# Patient Record
Sex: FEMALE | Race: BLACK OR AFRICAN AMERICAN | NOT HISPANIC OR LATINO | Employment: FULL TIME | URBAN - METROPOLITAN AREA
[De-identification: names, ages, dates, MRNs, and addresses within clinical notes are randomized per-mention and may not be internally consistent; named-entity substitution may affect disease eponyms.]

---

## 2017-06-29 ENCOUNTER — HOSPITAL ENCOUNTER (OUTPATIENT)
Dept: MAMMOGRAPHY | Facility: CLINIC | Age: 52
End: 2017-06-29
Attending: OBSTETRICS & GYNECOLOGY
Payer: COMMERCIAL

## 2017-06-29 ENCOUNTER — HOSPITAL ENCOUNTER (OUTPATIENT)
Dept: MAMMOGRAPHY | Facility: CLINIC | Age: 52
Discharge: HOME | End: 2017-06-29
Attending: OBSTETRICS & GYNECOLOGY | Admitting: OBSTETRICS & GYNECOLOGY
Payer: COMMERCIAL

## 2017-06-29 DIAGNOSIS — N64.4 BREAST PAIN: ICD-10-CM

## 2017-06-29 DIAGNOSIS — Z12.31 VISIT FOR SCREENING MAMMOGRAM: ICD-10-CM

## 2017-06-29 PROCEDURE — G0279 TOMOSYNTHESIS, MAMMO: HCPCS

## 2017-06-29 PROCEDURE — 76642 ULTRASOUND BREAST LIMITED: CPT | Mod: 50

## 2018-07-10 ENCOUNTER — HOSPITAL ENCOUNTER (OUTPATIENT)
Dept: MAMMOGRAPHY | Facility: CLINIC | Age: 53
Discharge: HOME | End: 2018-07-10
Attending: OBSTETRICS & GYNECOLOGY | Admitting: OBSTETRICS & GYNECOLOGY
Payer: COMMERCIAL

## 2018-07-10 DIAGNOSIS — Z12.31 VISIT FOR SCREENING MAMMOGRAM: ICD-10-CM

## 2018-07-10 PROCEDURE — 77067 SCR MAMMO BI INCL CAD: CPT

## 2018-12-17 ENCOUNTER — OFFICE VISIT (OUTPATIENT)
Dept: URGENT CARE | Facility: URGENT CARE | Age: 53
End: 2018-12-17
Payer: COMMERCIAL

## 2018-12-17 VITALS
TEMPERATURE: 97.8 F | OXYGEN SATURATION: 98 % | HEART RATE: 74 BPM | RESPIRATION RATE: 16 BRPM | SYSTOLIC BLOOD PRESSURE: 125 MMHG | DIASTOLIC BLOOD PRESSURE: 81 MMHG

## 2018-12-17 DIAGNOSIS — L02.219 CELLULITIS AND ABSCESS OF TRUNK: Primary | ICD-10-CM

## 2018-12-17 DIAGNOSIS — L03.319 CELLULITIS AND ABSCESS OF TRUNK: Primary | ICD-10-CM

## 2018-12-17 PROCEDURE — 99203 OFFICE O/P NEW LOW 30 MIN: CPT | Mod: 25

## 2018-12-17 PROCEDURE — 10060 I&D ABSCESS SIMPLE/SINGLE: CPT

## 2018-12-17 RX ORDER — CEPHALEXIN 500 MG/1
500 CAPSULE ORAL 3 TIMES DAILY
Qty: 15 CAPSULE | Refills: 0 | Status: SHIPPED | OUTPATIENT
Start: 2018-12-17 | End: 2018-12-22

## 2018-12-17 ASSESSMENT — ENCOUNTER SYMPTOMS
CHILLS: 0
FATIGUE: 0
FEVER: 0

## 2018-12-18 NOTE — PATIENT INSTRUCTIONS
Patient Education     Abscess (Incision & Drainage)  An abscess is sometimes called a boil. It happens when bacteria get trapped under the skin and start to grow. Pus forms inside the abscess as the body responds to the bacteria. An abscess can happen with an insect bite, ingrown hair, blocked oil gland, pimple, cyst, or puncture wound.  Your healthcare provider has drained the pus from your abscess. If the abscess pocket was large, your healthcare provider may have put in gauze packing. Your provider will need to remove it on your next visit. He or she may also replace it at that time. You may not need antibiotics to treat a simple abscess, unless the infection is spreading into the skin around the wound (cellulitis).  The wound will take about 1 to 2 weeks to heal, depending on the size of the abscess. Healthy tissue will grow from the bottom and sides of the opening until it seals over.  Home care  These tips can help your wound heal:    The wound may drain for the first 2 days. Cover the wound with a clean dry dressing. Change the dressing if it becomes soaked with blood or pus.    If a gauze packing was placed inside the abscess pocket, you may be told to remove it yourself. You may do this in the shower. Once the packing is removed, you should wash the area in the shower, or clean the area as directed by your provider. Continue to do this until the skin opening has closed. Make sure you wash your hands after changing the packing or cleaning the wound.    If you were prescribed antibiotics, take them as directed until they are all gone.    You may use acetaminophen or ibuprofen to control pain, unless another pain medicine was prescribed. If you have liver disease or ever had a stomach ulcer, talk with your doctor before using these medicines.  Follow-up care  Follow up with your healthcare provider, or as advised. If a gauze packing was put in your wound, it should be removed in 1 to 2 days. Check your wound  every day for any signs that the infection is getting worse. The signs are listed below.  When to seek medical advice  Call your healthcare provider right away if any of these occur:    Increasing redness or swelling    Red streaks in the skin leading away from the wound    Increasing local pain or swelling    Continued pus draining from the wound 2 days after treatment    Fever of 100.4 F (38 C) or higher, or as directed by your healthcare provider    Boil returns when you are at home  Date Last Reviewed: 9/1/2016 2000-2018 [employer]. [address] [place], [place] [id number]. All rights reserved. This information is not intended as a substitute for professional medical care. Always follow your healthcare professional's instructions.

## 2018-12-18 NOTE — PROGRESS NOTES
SUBJECTIVE:   Lyssa Casey is a 53 year old female presenting with a chief complaint of   Chief Complaint   Patient presents with     Urgent Care     Derm Problem     Pt has had cyst on left breast for year but this weekend it started to drain and also become painful        She is a new patient of [facility].    Rash  Cyst under breast for years. Past two days painful, draining and erythematous  Onset of rash was 2 day(s) ago.   Course of illness is sudden onset and worsening.  Severity moderate  Current and Associated symptoms: painful, discharge and red   Location of the rash: under breast.  Previous history of a similar rash? No  Recent exposure history: none known  Denies exposure to: none known  Associated symptoms include: nothing.  Treatment measures tried include: none      Review of Systems   Constitutional: Negative for chills, fatigue and fever.       History reviewed. No pertinent past medical history.  History reviewed. No pertinent family history.  Current Outpatient Medications   Medication Sig Dispense Refill     cephALEXin (KEFLEX) 500 MG capsule Take 1 capsule (500 mg) by mouth 3 times daily for 5 days 15 capsule 0     Social History     Tobacco Use     Smoking status: Never Smoker     Smokeless tobacco: Never Used   Substance Use Topics     Alcohol use: Not on file       OBJECTIVE  /81   Pulse 74   Temp 97.8  F (36.6  C) (Tympanic)   Resp 16   SpO2 98%     Physical Exam   Skin:          Discussed fact she has abscess and need drainage. Lesion anasesthia with lidocaine 1%. 11 blade opened wound. Packed with nu gauze  Labs:  No results found for this or any previous visit (from the past 24 hour(s)).    X-Ray was not done.      With patient permission. Use of 1% lidocaine. Used 1 ml for local anaesthetic. 11 blade opened abscess cavity 1 cm. Drained sebum and some pus. Pulled out pieces of cyst wall. Minimal bleeding. Packed with plain 1/2 inch gauze. Patient tolerated very  well  ASSESSMENT:      ICD-10-CM    1. Cellulitis and abscess of trunk L03.319     L02.219         Medical Decision Making:    Differential Diagnosis:  None    Serious Comorbid Conditions:  Adult:  None    PLAN:  Keflex tid for five days. Packing daily for next two days. Keep skin clean and covered until seals.   Followup:    If not improving or if condition worsens, follow up with your Primary Care Provider    Patient Instructions   Patient Education     Abscess (Incision & Drainage)  An abscess is sometimes called a boil. It happens when bacteria get trapped under the skin and start to grow. Pus forms inside the abscess as the body responds to the bacteria. An abscess can happen with an insect bite, ingrown hair, blocked oil gland, pimple, cyst, or puncture wound.  Your healthcare provider has drained the pus from your abscess. If the abscess pocket was large, your healthcare provider may have put in gauze packing. Your provider will need to remove it on your next visit. He or she may also replace it at that time. You may not need antibiotics to treat a simple abscess, unless the infection is spreading into the skin around the wound (cellulitis).  The wound will take about 1 to 2 weeks to heal, depending on the size of the abscess. Healthy tissue will grow from the bottom and sides of the opening until it seals over.  Home care  These tips can help your wound heal:    The wound may drain for the first 2 days. Cover the wound with a clean dry dressing. Change the dressing if it becomes soaked with blood or pus.    If a gauze packing was placed inside the abscess pocket, you may be told to remove it yourself. You may do this in the shower. Once the packing is removed, you should wash the area in the shower, or clean the area as directed by your provider. Continue to do this until the skin opening has closed. Make sure you wash your hands after changing the packing or cleaning the wound.    If you were prescribed  antibiotics, take them as directed until they are all gone.    You may use acetaminophen or ibuprofen to control pain, unless another pain medicine was prescribed. If you have liver disease or ever had a stomach ulcer, talk with your doctor before using these medicines.  Follow-up care  Follow up with your healthcare provider, or as advised. If a gauze packing was put in your wound, it should be removed in 1 to 2 days. Check your wound every day for any signs that the infection is getting worse. The signs are listed below.  When to seek medical advice  Call your healthcare provider right away if any of these occur:    Increasing redness or swelling    Red streaks in the skin leading away from the wound    Increasing local pain or swelling    Continued pus draining from the wound 2 days after treatment    Fever of 100.4 F (38 C) or higher, or as directed by your healthcare provider    Boil returns when you are at home  Date Last Reviewed: 9/1/2016 2000-2018 [employer]. [address] [place] [address], [place] [address]. All rights reserved. This information is not intended as a substitute for professional medical care. Always follow your healthcare professional's instructions.

## 2019-05-28 ENCOUNTER — OFFICE VISIT (OUTPATIENT)
Dept: URGENT CARE | Facility: URGENT CARE | Age: 54
End: 2019-05-28
Payer: COMMERCIAL

## 2019-05-28 VITALS
WEIGHT: 147 LBS | SYSTOLIC BLOOD PRESSURE: 120 MMHG | HEART RATE: 85 BPM | OXYGEN SATURATION: 99 % | RESPIRATION RATE: 16 BRPM | DIASTOLIC BLOOD PRESSURE: 65 MMHG

## 2019-05-28 DIAGNOSIS — M62.838 MUSCLE SPASMS OF NECK: Primary | ICD-10-CM

## 2019-05-28 PROCEDURE — 99214 OFFICE O/P EST MOD 30 MIN: CPT | Performed by: PHYSICIAN ASSISTANT

## 2019-05-28 RX ORDER — CYCLOBENZAPRINE HCL 5 MG
5 TABLET ORAL 3 TIMES DAILY PRN
Qty: 45 TABLET | Refills: 0 | Status: SHIPPED | OUTPATIENT
Start: 2019-05-28

## 2019-05-28 NOTE — PATIENT INSTRUCTIONS
(Q54.918) Muscle spasms of neck  (primary encounter diagnosis)  Comment:   Plan: cyclobenzaprine (FLEXERIL) 5 MG tablet,         acetaminophen-codeine (TYLENOL #3) 300-30 MG         tablet            Take the ibuprofen 800mg with food every 8 hours  Take flexeril 5 mg every 8 hours while at home resting or just at bedtime.     Tylenol #3 as needed for breakthrough pain.      Heat, stretches, ice as discussed 3-4 times a day  (heat 10, stretch then 20 min ice) all over thin cloth.      Follow up with primary clinic should symptoms persist or worsen.

## 2019-05-28 NOTE — PROGRESS NOTES
Patient presents with:  Neck Pain: Pt states she has had L neck pain for 2 weeks that has gotten increasingly worse    SUBJECTIVE:  Chief Complaint   Patient presents with     Neck Pain     Pt states she has had L neck pain for 2 weeks that has gotten increasingly worse     Lyssa Casey is a 53 year old female presents with a chief complaint of left sided neck pain, onset 2 weeks ago, progressively worsening.  No specific trauma.  Had been doing a lifting program, but stopped it about 1.5 weeks ago.    Does play a lot of tennis.      Denies any numbness or tingling in her upper extremities.      She has been alternating advil and tylenol which takes the edge off, but symptoms persist.    Pain exacerbated by movement.  Relieved by as above.  She treated it initially with as above.     No history of similar symptoms.     No past medical history on file.   Denies any significant PMH    FH  Denies any significant FH      There is no problem list on file for this patient.    Social History     Tobacco Use     Smoking status: Never Smoker     Smokeless tobacco: Never Used   Substance Use Topics     Alcohol use: Not on file       ROS:  CONSTITUTIONAL:NEGATIVE for fever, chills, change in weight  INTEGUMENTARY/SKIN: NEGATIVE for worrisome rashes, moles or lesions  ENT/MOUTH: NEGATIVE for ear, mouth and throat problems  RESP:NEGATIVE for significant cough or SOB  CV: NEGATIVE for chest pain, palpitations or peripheral edema  GI: NEGATIVE for nausea, abdominal pain, heartburn, or change in bowel habits  MUSCULOSKELETAL: as per HPI  NEURO: negative  ENDOCRINE: NEGATIVE for temperature intolerance, skin/hair changes  Review of systems negative except as stated above.    EXAM:   /65 (BP Location: Right arm, Patient Position: Sitting, Cuff Size: Adult Regular)   Pulse 85   Resp 16   Wt 66.7 kg (147 lb)   SpO2 99%   Gen: healthy,alert,mild distress secondary to pain  NECK: palpable muscle spasm on left neck.  No  vertebral tenderness.    ROM is limited secondary to pain.    Upper Extremity: ROM is mildly decreased secondary to pain.   There is not compromise to the distal circulation.  Pulses are +2 and CRT is brisk  GENERAL APPEARANCE: healthy, alert and no distress  SKIN: no suspicious lesions or rashes  NEURO: Normal strength and tone, sensory exam grossly normal, mentation intact and speech normal    (M62.838) Muscle spasms of neck  (primary encounter diagnosis)  Comment:   Plan: cyclobenzaprine (FLEXERIL) 5 MG tablet,         acetaminophen-codeine (TYLENOL #3) 300-30 MG         tablet          Take the ibuprofen 800mg with food every 8 hours  Take flexeril 5 mg every 8 hours while at home resting or just at bedtime.     Tylenol #3 as needed for breakthrough pain.  Use with caution.      Heat, stretches, ice as discussed 3-4 times a day  (heat 10, stretch then 20 min ice) all over thin cloth.      Follow up with primary clinic should symptoms persist or worsen.    Patient expresses understanding and agreement with the assessment and plan as above.

## 2019-07-16 ENCOUNTER — HOSPITAL ENCOUNTER (OUTPATIENT)
Dept: MAMMOGRAPHY | Facility: CLINIC | Age: 54
Discharge: HOME | End: 2019-07-16
Attending: OBSTETRICS & GYNECOLOGY | Admitting: OBSTETRICS & GYNECOLOGY
Payer: COMMERCIAL

## 2019-07-16 DIAGNOSIS — Z12.31 VISIT FOR SCREENING MAMMOGRAM: ICD-10-CM

## 2019-07-16 PROCEDURE — 77063 BREAST TOMOSYNTHESIS BI: CPT

## 2020-07-21 ENCOUNTER — HOSPITAL ENCOUNTER (OUTPATIENT)
Dept: MAMMOGRAPHY | Facility: CLINIC | Age: 55
Discharge: HOME | End: 2020-07-21
Attending: OBSTETRICS & GYNECOLOGY | Admitting: OBSTETRICS & GYNECOLOGY
Payer: COMMERCIAL

## 2020-07-21 DIAGNOSIS — Z12.31 VISIT FOR SCREENING MAMMOGRAM: ICD-10-CM

## 2020-07-21 PROCEDURE — 77063 BREAST TOMOSYNTHESIS BI: CPT

## 2021-07-23 ENCOUNTER — HOSPITAL ENCOUNTER (OUTPATIENT)
Dept: MAMMOGRAPHY | Facility: CLINIC | Age: 56
Discharge: HOME | End: 2021-07-23
Attending: PHYSICIAN ASSISTANT | Admitting: PHYSICIAN ASSISTANT
Payer: COMMERCIAL

## 2021-07-23 DIAGNOSIS — Z12.31 VISIT FOR SCREENING MAMMOGRAM: ICD-10-CM

## 2021-07-23 PROCEDURE — 77063 BREAST TOMOSYNTHESIS BI: CPT

## 2022-08-05 ENCOUNTER — HOSPITAL ENCOUNTER (OUTPATIENT)
Dept: MAMMOGRAPHY | Facility: CLINIC | Age: 57
Discharge: HOME | End: 2022-08-05
Attending: PHYSICIAN ASSISTANT | Admitting: PHYSICIAN ASSISTANT
Payer: COMMERCIAL

## 2022-08-05 DIAGNOSIS — Z12.31 VISIT FOR SCREENING MAMMOGRAM: ICD-10-CM

## 2022-08-05 PROCEDURE — 77067 SCR MAMMO BI INCL CAD: CPT

## 2023-08-07 ENCOUNTER — HOSPITAL ENCOUNTER (OUTPATIENT)
Dept: MAMMOGRAPHY | Facility: CLINIC | Age: 58
Discharge: HOME | End: 2023-08-07
Attending: PHYSICIAN ASSISTANT | Admitting: PHYSICIAN ASSISTANT
Payer: COMMERCIAL

## 2023-08-07 DIAGNOSIS — Z12.31 VISIT FOR SCREENING MAMMOGRAM: ICD-10-CM

## 2023-08-07 PROCEDURE — 77067 SCR MAMMO BI INCL CAD: CPT

## 2024-03-06 PROCEDURE — 25605 CLTX DST RDL FX/EPHYS SEP W/: CPT | Mod: LT

## 2024-03-06 PROCEDURE — 99284 EMERGENCY DEPT VISIT MOD MDM: CPT | Mod: 25

## 2024-03-07 ENCOUNTER — HOSPITAL ENCOUNTER (EMERGENCY)
Facility: CLINIC | Age: 59
Discharge: HOME | End: 2024-03-07
Attending: EMERGENCY MEDICINE | Admitting: EMERGENCY MEDICINE
Payer: COMMERCIAL

## 2024-03-07 ENCOUNTER — APPOINTMENT (OUTPATIENT)
Dept: GENERAL RADIOLOGY | Facility: CLINIC | Age: 59
End: 2024-03-07
Attending: EMERGENCY MEDICINE
Payer: COMMERCIAL

## 2024-03-07 VITALS
HEIGHT: 60 IN | WEIGHT: 152 LBS | TEMPERATURE: 98.3 F | HEART RATE: 79 BPM | RESPIRATION RATE: 16 BRPM | DIASTOLIC BLOOD PRESSURE: 70 MMHG | OXYGEN SATURATION: 99 % | SYSTOLIC BLOOD PRESSURE: 114 MMHG | BODY MASS INDEX: 29.84 KG/M2

## 2024-03-07 DIAGNOSIS — S52.532A CLOSED COLLES' FRACTURE OF LEFT RADIUS, INITIAL ENCOUNTER: ICD-10-CM

## 2024-03-07 PROCEDURE — 999N000065 XR WRIST LEFT G/E 3 VIEWS: Mod: LT

## 2024-03-07 PROCEDURE — 250N000013 HC RX MED GY IP 250 OP 250 PS 637: Performed by: EMERGENCY MEDICINE

## 2024-03-07 PROCEDURE — 73110 X-RAY EXAM OF WRIST: CPT | Mod: LT

## 2024-03-07 RX ORDER — ACETAMINOPHEN 500 MG
1000 TABLET ORAL ONCE
Status: COMPLETED | OUTPATIENT
Start: 2024-03-07 | End: 2024-03-07

## 2024-03-07 RX ORDER — OXYCODONE HYDROCHLORIDE 5 MG/1
5 TABLET ORAL ONCE
Status: COMPLETED | OUTPATIENT
Start: 2024-03-07 | End: 2024-03-07

## 2024-03-07 RX ORDER — OXYCODONE HYDROCHLORIDE 5 MG/1
5 TABLET ORAL EVERY 6 HOURS PRN
Qty: 8 TABLET | Refills: 0 | Status: SHIPPED | OUTPATIENT
Start: 2024-03-07 | End: 2024-03-10

## 2024-03-07 RX ADMIN — ACETAMINOPHEN 1000 MG: 500 TABLET, FILM COATED ORAL at 04:31

## 2024-03-07 RX ADMIN — OXYCODONE HYDROCHLORIDE 5 MG: 5 TABLET ORAL at 04:31

## 2024-03-07 ASSESSMENT — ACTIVITIES OF DAILY LIVING (ADL)
ADLS_ACUITY_SCORE: 35

## 2024-03-07 ASSESSMENT — COLUMBIA-SUICIDE SEVERITY RATING SCALE - C-SSRS
1. IN THE PAST MONTH, HAVE YOU WISHED YOU WERE DEAD OR WISHED YOU COULD GO TO SLEEP AND NOT WAKE UP?: NO
6. HAVE YOU EVER DONE ANYTHING, STARTED TO DO ANYTHING, OR PREPARED TO DO ANYTHING TO END YOUR LIFE?: NO
2. HAVE YOU ACTUALLY HAD ANY THOUGHTS OF KILLING YOURSELF IN THE PAST MONTH?: NO

## 2024-03-07 NOTE — ED TRIAGE NOTES
Patient states fell; pain to left wrist.  Fell.  Did not hit head.  Fell playing tennis.  Happened at 10pm.     Triage Assessment (Adult)       Row Name 03/07/24 0008          Triage Assessment    Airway WDL WDL        Respiratory WDL    Respiratory WDL WDL        Skin Circulation/Temperature WDL    Skin Circulation/Temperature WDL WDL        Cardiac WDL    Cardiac WDL WDL        Cognitive/Neuro/Behavioral WDL    Cognitive/Neuro/Behavioral WDL WDL

## 2024-03-07 NOTE — ED PROVIDER NOTES
History     Chief Complaint:  Wrist Injury       HPI   Lyssa Casey is a 58 year old female presenting to the emergency department for evaluation of a left wrist injury. The patient reports falling tonight around 2200 while playing tennis and broke her fall with her left hand. She reports pain and tenderness of her left wrist and forearm. She denies hitting her head.         Independent Historian:   None - Patient Only          Medications:    Flexeril  Wegovy     Past Medical History:    Patient denies past medical history.    Past Surgical History:    None.      Physical Exam   Patient Vitals for the past 24 hrs:   BP Temp Temp src Pulse Resp SpO2 Height Weight   03/07/24 0519 114/70 -- -- 79 -- -- -- --   03/07/24 0010 (!) 141/89 98.3  F (36.8  C) Oral 88 16 99 % 1.524 m (5') 68.9 kg (152 lb)        Physical Exam  General:  Alert, nontoxic in appearance  CV:  Appears well perfused  Lungs:  No obvious respiratory distress  Neuro:  Speaking clearly, no slurred speech  MSK: +tenderness and mild swelling to left wrist.  No tenderness to remainder of arm.  Distal pulses intact.  Normal strength, cap refill, and sensation distally.       Emergency Department Course     Imaging:  XR Wrist Left G/E 3 Views   Final Result   IMPRESSION: Casting material obscures some details. Partial reduction of the transverse fracture of the distal radius. Mild dorsal angulation and displacement remains. Clinical correlation for alignment recommended.                     XR Wrist Left G/E 3 Views   Final Result   IMPRESSION:       Acute angulated transverse fracture of the distal radial metaphysis. There is dorsal angulation of the distal fracture fragment. Questionable lucency at the articular surface, raising suspicion for intra-articular extension. Adjacent soft tissue    swelling.      No evidence of additional acute fracture. Small well-corticated ossification at the ulnar styloid, likely sequela of remote injury.      No  dislocation.             Laboratory:  Labs Ordered and Resulted from Time of ED Arrival to Time of ED Departure - No data to display     Procedures    Hematoma Block     LOCATION:  Left distal radius    CONSENT: Risks and benefits were discussed with the patient and verbal consent for the procedure was obtained     ANESTHESIA: Hematoma block using 0.5 Bupivacaine without Epinephrine , total of 5 mLs    PROCEDURE NOTE: The site of injection was cleaned and prepped with chloraprep.  Flash back of blood was seen to confirm appropriate needle position.  Above anesthetic was injected into the fracture site. The patient tolerated the procedure well with good relief of discomfort and there were no complications.   0423        I performed the hematoma block.      Fracture Reduction     Procedure: Fracture Reduction     Indication: Left distal radius fracture    Location: Left     Consent: Written from Patient   Risks (including but not limited to: neurologic compromise, vascular injury, pain, and inability to reduce fracture), benefits, and alternatives were discussed with patient and consent for procedure was obtained.     Universal Protocol: Universal protocol was followed and time out conducted just prior to starting procedure, confirming patient identity, site/side, procedure, patient position, and availability of correct equipment and implants.     Anesthesia/Sedation: Bupivacaine - 0.5%    Procedure Detail: I manually manipulated and reduced the fracture. Ultrasound was used.    Immobilized with: Plaster sugar tong splint  Post-reduction x-ray showed adequate reduction/not adequate reduction  Post-procedure distal neurovascular function was intact    Patient Status:  The patient tolerated the procedure well: Yes. There were no complications.  The procedure was performed at 0550.    Emergency Department Course & Assessments:    Interventions:  Medications   oxyCODONE (ROXICODONE) tablet 5 mg (5 mg Oral $Given 3/7/24  0431)   acetaminophen (TYLENOL) tablet 1,000 mg (1,000 mg Oral $Given 3/7/24 0431)        Assessments:  0405        I obtained history and examined the patient as noted above.   0423        I performed the hematoma block  0550        I performed the fracture reduction    Independent Interpretation (X-rays, CTs, rhythm strip):  On my independent interpretation of wrist XR showing radius fracture    Consultations/Discussion of Management or Tests:  None        Social Determinants of Health affecting care:   None    Disposition:  The patient was discharged.     Impression & Plan      Medical Decision Making:  Lyssa Casey presents with left wrist pain after she fell.  X-rays were obtained that did show a distal radius fracture.  Patient denies any other injury.  After discussing risks and benefits, I did perform a hematoma block and reduction and splinting was performed.  Patient did have improved alignment of the fracture fragment.  Patient was discharged home with recommendation for supportive care and follow-up with orthopedics.      Diagnosis:    ICD-10-CM    1. Closed Colles' fracture of left radius, initial encounter  S52.532A            Discharge Medications:  Discharge Medication List as of 3/7/2024  6:22 AM        START taking these medications    Details   oxyCODONE (ROXICODONE) 5 MG tablet Take 1 tablet (5 mg) by mouth every 6 hours as needed, Disp-8 tablet, R-0, Local Print                Scribe Disclosure:  I, Felipe Rodriguez, am serving as a scribe at 4:15 AM on 3/7/2024 to document services personally performed by Tahir Moyer MD based on my observations and the provider's statements to me.   3/7/2024   Tahir Moyer MD Bergenstal, John A, MD  03/07/24 4855

## 2024-08-13 ENCOUNTER — HOSPITAL ENCOUNTER (OUTPATIENT)
Dept: MAMMOGRAPHY | Facility: CLINIC | Age: 59
Discharge: HOME | End: 2024-08-13
Attending: OBSTETRICS & GYNECOLOGY | Admitting: OBSTETRICS & GYNECOLOGY
Payer: COMMERCIAL

## 2024-08-13 DIAGNOSIS — Z12.31 VISIT FOR SCREENING MAMMOGRAM: ICD-10-CM

## 2024-08-13 PROCEDURE — 77063 BREAST TOMOSYNTHESIS BI: CPT

## 2024-09-21 ENCOUNTER — TELEPHONE (OUTPATIENT)
Dept: OBGYN | Facility: CLINIC | Age: 59
End: 2024-09-21
Payer: COMMERCIAL

## 2024-09-21 DIAGNOSIS — R11.2 NAUSEA AND VOMITING, UNSPECIFIED VOMITING TYPE: Primary | ICD-10-CM

## 2024-09-21 NOTE — TELEPHONE ENCOUNTER
General Call      Reason for Call:  Prescription    What are your questions or concerns:  Bertin from [facility] pharmacy calling about a voicemail he received from provider. A prescription was called in for this patient, but provider didn't leave any information so the pharmacy can't fill it. The pharmacy needs providers NPI number. Could not verify that a prescription was called in for this patient, as nothing was recorded in chart.     Date of last appointment with provider: N/A    Okay to leave a detailed message?: Please call pharmacy with NPI number and to verify prescription if it was you that called.  Pharmacy number: [phone]

## 2024-09-23 RX ORDER — ONDANSETRON 4 MG/1
4 TABLET, ORALLY DISINTEGRATING ORAL EVERY 6 HOURS PRN
Qty: 90 TABLET | Refills: 3 | Status: SHIPPED | OUTPATIENT
Start: 2024-09-23

## 2024-09-23 NOTE — TELEPHONE ENCOUNTER
Not sure what they meant by no info was given, but I just faxed a new prescription.  Thanks    NELIDA BOYD MD

## 2025-04-11 ENCOUNTER — APPOINTMENT (OUTPATIENT)
Dept: CT IMAGING | Facility: CLINIC | Age: 60
End: 2025-04-11
Attending: EMERGENCY MEDICINE
Payer: COMMERCIAL

## 2025-04-11 ENCOUNTER — HOSPITAL ENCOUNTER (EMERGENCY)
Facility: CLINIC | Age: 60
Discharge: HOME | End: 2025-04-11
Attending: EMERGENCY MEDICINE | Admitting: EMERGENCY MEDICINE
Payer: COMMERCIAL

## 2025-04-11 VITALS
DIASTOLIC BLOOD PRESSURE: 92 MMHG | TEMPERATURE: 98.2 F | SYSTOLIC BLOOD PRESSURE: 122 MMHG | BODY MASS INDEX: 26.37 KG/M2 | OXYGEN SATURATION: 99 % | HEART RATE: 82 BPM | RESPIRATION RATE: 18 BRPM | WEIGHT: 135 LBS

## 2025-04-11 DIAGNOSIS — K65.4 MESENTERIC PANNICULITIS (H): ICD-10-CM

## 2025-04-11 LAB
ALBUMIN SERPL BCG-MCNC: 4.2 G/DL (ref 3.5–5.2)
ALBUMIN UR-MCNC: NEGATIVE MG/DL
ALP SERPL-CCNC: 62 U/L (ref 40–150)
ALT SERPL W P-5'-P-CCNC: 40 U/L (ref 0–50)
ANION GAP SERPL CALCULATED.3IONS-SCNC: 8 MMOL/L (ref 7–15)
APPEARANCE UR: CLEAR
AST SERPL W P-5'-P-CCNC: 25 U/L (ref 0–45)
BASOPHILS # BLD AUTO: 0 10E3/UL (ref 0–0.2)
BASOPHILS NFR BLD AUTO: 0 %
BILIRUB SERPL-MCNC: 0.2 MG/DL
BILIRUB UR QL STRIP: NEGATIVE
BUN SERPL-MCNC: 11 MG/DL (ref 6–20)
CALCIUM SERPL-MCNC: 10 MG/DL (ref 8.8–10.4)
CHLORIDE SERPL-SCNC: 101 MMOL/L (ref 98–107)
COLOR UR AUTO: ABNORMAL
CREAT SERPL-MCNC: 0.69 MG/DL (ref 0.51–0.95)
EGFRCR SERPLBLD CKD-EPI 2021: >90 ML/MIN/1.73M2
EOSINOPHIL # BLD AUTO: 0.3 10E3/UL (ref 0–0.7)
EOSINOPHIL NFR BLD AUTO: 3 %
ERYTHROCYTE [DISTWIDTH] IN BLOOD BY AUTOMATED COUNT: 12.8 % (ref 10–15)
GLUCOSE SERPL-MCNC: 95 MG/DL (ref 70–99)
GLUCOSE UR STRIP-MCNC: NEGATIVE MG/DL
HCO3 SERPL-SCNC: 30 MMOL/L (ref 22–29)
HCT VFR BLD AUTO: 37.9 % (ref 35–47)
HGB BLD-MCNC: 12.7 G/DL (ref 11.7–15.7)
HGB UR QL STRIP: NEGATIVE
IMM GRANULOCYTES # BLD: 0 10E3/UL
IMM GRANULOCYTES NFR BLD: 0 %
KETONES UR STRIP-MCNC: NEGATIVE MG/DL
LEUKOCYTE ESTERASE UR QL STRIP: NEGATIVE
LIPASE SERPL-CCNC: 22 U/L (ref 13–60)
LYMPHOCYTES # BLD AUTO: 2.1 10E3/UL (ref 0.8–5.3)
LYMPHOCYTES NFR BLD AUTO: 23 %
MCH RBC QN AUTO: 30.8 PG (ref 26.5–33)
MCHC RBC AUTO-ENTMCNC: 33.5 G/DL (ref 31.5–36.5)
MCV RBC AUTO: 92 FL (ref 78–100)
MONOCYTES # BLD AUTO: 0.6 10E3/UL (ref 0–1.3)
MONOCYTES NFR BLD AUTO: 7 %
NEUTROPHILS # BLD AUTO: 6 10E3/UL (ref 1.6–8.3)
NEUTROPHILS NFR BLD AUTO: 67 %
NITRATE UR QL: NEGATIVE
NRBC # BLD AUTO: 0 10E3/UL
NRBC BLD AUTO-RTO: 0 /100
PH UR STRIP: 7.5 [PH] (ref 5–7)
PLATELET # BLD AUTO: 371 10E3/UL (ref 150–450)
POTASSIUM SERPL-SCNC: 4.7 MMOL/L (ref 3.4–5.3)
PROT SERPL-MCNC: 7.4 G/DL (ref 6.4–8.3)
RBC # BLD AUTO: 4.12 10E6/UL (ref 3.8–5.2)
RBC URINE: <1 /HPF
SODIUM SERPL-SCNC: 139 MMOL/L (ref 135–145)
SP GR UR STRIP: 1.01 (ref 1–1.03)
SQUAMOUS EPITHELIAL: <1 /HPF
UROBILINOGEN UR STRIP-MCNC: NORMAL MG/DL
WBC # BLD AUTO: 9 10E3/UL (ref 4–11)
WBC URINE: <1 /HPF

## 2025-04-11 PROCEDURE — 250N000011 HC RX IP 250 OP 636: Mod: JZ | Performed by: EMERGENCY MEDICINE

## 2025-04-11 PROCEDURE — 96375 TX/PRO/DX INJ NEW DRUG ADDON: CPT

## 2025-04-11 PROCEDURE — 96376 TX/PRO/DX INJ SAME DRUG ADON: CPT

## 2025-04-11 PROCEDURE — 83690 ASSAY OF LIPASE: CPT | Performed by: EMERGENCY MEDICINE

## 2025-04-11 PROCEDURE — 36415 COLL VENOUS BLD VENIPUNCTURE: CPT | Performed by: EMERGENCY MEDICINE

## 2025-04-11 PROCEDURE — 250N000013 HC RX MED GY IP 250 OP 250 PS 637: Performed by: EMERGENCY MEDICINE

## 2025-04-11 PROCEDURE — 82310 ASSAY OF CALCIUM: CPT | Performed by: EMERGENCY MEDICINE

## 2025-04-11 PROCEDURE — 99285 EMERGENCY DEPT VISIT HI MDM: CPT | Mod: 25

## 2025-04-11 PROCEDURE — 74176 CT ABD & PELVIS W/O CONTRAST: CPT

## 2025-04-11 PROCEDURE — 81001 URINALYSIS AUTO W/SCOPE: CPT | Performed by: EMERGENCY MEDICINE

## 2025-04-11 PROCEDURE — 96374 THER/PROPH/DIAG INJ IV PUSH: CPT

## 2025-04-11 PROCEDURE — 84520 ASSAY OF UREA NITROGEN: CPT | Performed by: EMERGENCY MEDICINE

## 2025-04-11 PROCEDURE — 85025 COMPLETE CBC W/AUTO DIFF WBC: CPT | Performed by: EMERGENCY MEDICINE

## 2025-04-11 RX ORDER — PREDNISONE 20 MG/1
TABLET ORAL
Qty: 10 TABLET | Refills: 0 | Status: SHIPPED | OUTPATIENT
Start: 2025-04-11

## 2025-04-11 RX ORDER — DEXAMETHASONE SODIUM PHOSPHATE 10 MG/ML
10 INJECTION, SOLUTION INTRAMUSCULAR; INTRAVENOUS ONCE
Status: COMPLETED | OUTPATIENT
Start: 2025-04-11 | End: 2025-04-11

## 2025-04-11 RX ORDER — HYDROCODONE BITARTRATE AND ACETAMINOPHEN 5; 325 MG/1; MG/1
1 TABLET ORAL EVERY 4 HOURS PRN
Qty: 12 TABLET | Refills: 0 | Status: SHIPPED | OUTPATIENT
Start: 2025-04-11

## 2025-04-11 RX ORDER — OXYCODONE HYDROCHLORIDE 5 MG/1
5 TABLET ORAL ONCE
Status: COMPLETED | OUTPATIENT
Start: 2025-04-11 | End: 2025-04-11

## 2025-04-11 RX ORDER — HYDROMORPHONE HYDROCHLORIDE 1 MG/ML
0.5 INJECTION, SOLUTION INTRAMUSCULAR; INTRAVENOUS; SUBCUTANEOUS ONCE
Status: COMPLETED | OUTPATIENT
Start: 2025-04-11 | End: 2025-04-11

## 2025-04-11 RX ORDER — ONDANSETRON 4 MG/1
4 TABLET, ORALLY DISINTEGRATING ORAL EVERY 8 HOURS PRN
Qty: 10 TABLET | Refills: 0 | Status: SHIPPED | OUTPATIENT
Start: 2025-04-11

## 2025-04-11 RX ORDER — KETOROLAC TROMETHAMINE 15 MG/ML
15 INJECTION, SOLUTION INTRAMUSCULAR; INTRAVENOUS ONCE
Status: COMPLETED | OUTPATIENT
Start: 2025-04-11 | End: 2025-04-11

## 2025-04-11 RX ADMIN — HYDROMORPHONE HYDROCHLORIDE 0.5 MG: 1 INJECTION, SOLUTION INTRAMUSCULAR; INTRAVENOUS; SUBCUTANEOUS at 23:01

## 2025-04-11 RX ADMIN — HYDROMORPHONE HYDROCHLORIDE 0.5 MG: 1 INJECTION, SOLUTION INTRAMUSCULAR; INTRAVENOUS; SUBCUTANEOUS at 20:42

## 2025-04-11 RX ADMIN — OXYCODONE HYDROCHLORIDE 5 MG: 5 TABLET ORAL at 22:59

## 2025-04-11 RX ADMIN — DEXAMETHASONE SODIUM PHOSPHATE 10 MG: 10 INJECTION, SOLUTION INTRAMUSCULAR; INTRAVENOUS at 22:59

## 2025-04-11 RX ADMIN — KETOROLAC TROMETHAMINE 15 MG: 15 INJECTION, SOLUTION INTRAMUSCULAR; INTRAVENOUS at 20:40

## 2025-04-11 ASSESSMENT — COLUMBIA-SUICIDE SEVERITY RATING SCALE - C-SSRS
2. HAVE YOU ACTUALLY HAD ANY THOUGHTS OF KILLING YOURSELF IN THE PAST MONTH?: NO
6. HAVE YOU EVER DONE ANYTHING, STARTED TO DO ANYTHING, OR PREPARED TO DO ANYTHING TO END YOUR LIFE?: NO
1. IN THE PAST MONTH, HAVE YOU WISHED YOU WERE DEAD OR WISHED YOU COULD GO TO SLEEP AND NOT WAKE UP?: NO

## 2025-04-11 ASSESSMENT — ACTIVITIES OF DAILY LIVING (ADL)
ADLS_ACUITY_SCORE: 41

## 2025-04-11 NOTE — ED TRIAGE NOTES
Abdominal pain - comes in waves that are 10/10. Started at 1500 today.  Patient stopped taking Wegovy in February but resumed on Monday.   Patient feels like throwing up but hasn't         
Self

## 2025-04-12 NOTE — ED PROVIDER NOTES
Emergency Department Note      History of Present Illness     Chief Complaint   Abdominal Pain and Flank Pain (Right sided)    HPI   Lyssa Casey is a 59 year old female who presents to the ED with her  and daughter for evaluation of abdominal pain and flank pain. Lyssa reports that today at 1500 she developed sudden onset of severe abdominal pain to the point that she could not stand. She had felt fine this morning and had a banana for lunch. The pain has moved from the right side radiating into her back, to across her abdomen, and is at the time of examination more left sided. Lyssa also reports severe nausea, however she has not vomited yet. No dysuria, hematuria, or increased urinary frequency. She denies a history of kidney stones. Additionally, Lyssa has not had any abdominal surgeries. Of note, Lyssa stopped taking Wegovy back in February, she restarted it on 04/07.     Independent Historian   None    Review of External Notes       Past Medical History     Medical History and Problem List   Closed fracture of left distal radius   Rupture of ulnar collateral ligament of right thumb    Medications   Wegovy     Surgical History   No past surgical history on file.    Physical Exam     Patient Vitals for the past 24 hrs:   BP Temp Temp src Pulse Resp SpO2 Weight   04/11/25 2230 (!) 122/92 -- -- -- -- -- --   04/11/25 2100 120/81 -- -- 82 -- -- --   04/11/25 1811 (!) 159/95 98.2  F (36.8  C) Temporal 91 18 99 % 61.2 kg (135 lb)     Physical Exam  General: Sitting uncomfortably on a chair next to the [place].  Head:  The scalp, face, and head appear normal  Eyes:  The pupils are equal, round, and reactive to light    There is no nystagmus    Extraocular muscles are intact    Conjunctivae and sclerae are normal  ENT:    The nose is normal    Pinnae are normal    The oropharynx is normal  Neck:  Normal range of motion    There is no rigidity noted  CV:  Normal rate  Normal rhythm     Normal S1/S2    No  pathological murmur detected  Resp:  Lungs are clear    There is no tachypnea    Non-labored    No rales    No wheezing   GI:  Abdomen is soft, there is no rigidity  Localized tenderness to left mid abdomen and lower quadrant   Slight tenderness in left upper quadrant   Right satya abdomen is benign   No tenderness over gallbladder or appendix     No distension    No rebound tenderness     Non-surgical without peritoneal features  MS:  Normal muscular tone    Symmetric motor strength  Skin:  No rash or acute skin lesions noted  Neuro:  Speech is normal and fluent, there is no aphasia    No motor deficits    Cranial nerves are intact  Psych: Awake. Alert.      Normal affect    Diagnostics     Lab Results   Labs Ordered and Resulted from Time of ED Arrival to Time of ED Departure   COMPREHENSIVE METABOLIC PANEL - Abnormal       Result Value    Sodium 139      Potassium 4.7      Carbon Dioxide (CO2) 30 (*)     Anion Gap 8      Urea Nitrogen 11.0      Creatinine 0.69      GFR Estimate >90      Calcium 10.0      Chloride 101      Glucose 95      Alkaline Phosphatase 62      AST 25      ALT 40      Protein Total 7.4      Albumin 4.2      Bilirubin Total 0.2     ROUTINE UA WITH MICROSCOPIC REFLEX TO CULTURE - Abnormal    Color Urine Straw      Appearance Urine Clear      Glucose Urine Negative      Bilirubin Urine Negative      Ketones Urine Negative      Specific Gravity Urine 1.008      Blood Urine Negative      pH Urine 7.5 (*)     Protein Albumin Urine Negative      Urobilinogen Urine Normal      Nitrite Urine Negative      Leukocyte Esterase Urine Negative      RBC Urine <1      WBC Urine <1      Squamous Epithelials Urine <1     LIPASE - Normal    Lipase 22     CBC WITH PLATELETS AND DIFFERENTIAL    WBC Count 9.0      RBC Count 4.12      Hemoglobin 12.7      Hematocrit 37.9      MCV 92      MCH 30.8      MCHC 33.5      RDW 12.8      Platelet Count 371      % Neutrophils 67      % Lymphocytes 23      % Monocytes 7       % Eosinophils 3      % Basophils 0      % Immature Granulocytes 0      NRBCs per 100 WBC 0      Absolute Neutrophils 6.0      Absolute Lymphocytes 2.1      Absolute Monocytes 0.6      Absolute Eosinophils 0.3      Absolute Basophils 0.0      Absolute Immature Granulocytes 0.0      Absolute NRBCs 0.0       Imaging   CT Abdomen Pelvis w/o Contrast   Final Result   IMPRESSION:    1.  No urinary calculi or hydronephrosis.   2.  Colonic diverticula without CT evidence for diverticulitis.   3.  Mild mesenteric congestion in the upper abdomen with a few scattered nodes. Mild mesenteric panniculitiscan have this appearance.   4.  Coils in the Fallopian tubes.           Independent Interpretation   None    ED Course      Medications Administered   Medications   ketorolac (TORADOL) injection 15 mg (15 mg Intravenous $Given 4/11/25 2040)   HYDROmorphone (PF) (DILAUDID) injection 0.5 mg (0.5 mg Intravenous $Given 4/11/25 2042)     Procedures   Procedures     Discussion of Management       ED Course   ED Course as of 04/11/25 2237 Fri Apr 11, 2025 2011 I obtained history and examined the patient as noted above.    2237 I updated and discharged the patient.      Additional Documentation  None    Medical Decision Making / Diagnosis     CMS Diagnoses: None    MIPS       None    [facility]   Lyssa Casey is a 59 year old female presents to the emergency department with abdominal pain as noted above.  The pain was somewhat vague and not specifically focal in nature although it did tend to bother her more in the left abdomen later in its course although it seemed to start in the right abdomen.  Patient underwent workup in the emergency department with blood work which is normal.  Urinalysis was normal.  The patient underwent CT scan of the abdomen looking for diverticulitis, other intra-abdominal infections, kidney stone, and the only finding was a vague and mild mesenteric panniculitis.  Given that the patient has no bacterial or  systemic features, we will start off by treating with this with pain control medications and a burst of corticosteroids.  Antibiotics are at times recommended but there is certainly no indication of bacterial infection at this juncture.  Hopefully this mild panniculitis/mesenteric congestion will clear in the coming days with some corticosteroids.  There is no obvious evidence of vascular catastrophe.    Disposition   The patient was discharged.     Diagnosis     ICD-10-CM    1. Mesenteric panniculitis (H)  K65.4            Discharge Medications   Discharge Medication List as of 4/11/2025 11:17 PM        START taking these medications    Details   HYDROcodone-acetaminophen (NORCO) 5-325 MG tablet Take 1 tablet by mouth every 4 hours as needed for moderate pain., Disp-12 tablet, R-0, E-Prescribe      !! ondansetron (ZOFRAN ODT) 4 MG ODT tab Take 1 tablet (4 mg) by mouth every 8 hours as needed for nausea or vomiting., Disp-10 tablet, R-0, E-Prescribe      predniSONE (DELTASONE) 20 MG tablet Take two tablets (= 40mg) each day for 5 (five) days, Disp-10 tablet, R-0, E-Prescribe       !! - Potential duplicate medications found. Please discuss with provider.        I, Karla Peterson, am serving as a scribe at 7:47 PM on 4/11/2025 to document services personally performed by Edward Long MD based on my observations and the provider's statements to me.        Edward Long MD  04/12/25 0005

## 2025-04-12 NOTE — DISCHARGE INSTRUCTIONS
Take the prednisone as needed for the next 5 days  Zofran as needed for nausea  Take Norco as needed for breakthrough pain  If you develop significant fevers, chills, or worsening abdominal pain, please return to the emergency department for additional evaluation

## 2025-08-19 ENCOUNTER — HOSPITAL ENCOUNTER (OUTPATIENT)
Dept: MAMMOGRAPHY | Facility: CLINIC | Age: 60
Discharge: HOME | End: 2025-08-19
Payer: COMMERCIAL

## 2025-08-19 DIAGNOSIS — Z12.31 VISIT FOR SCREENING MAMMOGRAM: ICD-10-CM

## 2025-08-19 PROCEDURE — 77063 BREAST TOMOSYNTHESIS BI: CPT
